# Patient Record
Sex: FEMALE | Race: WHITE
[De-identification: names, ages, dates, MRNs, and addresses within clinical notes are randomized per-mention and may not be internally consistent; named-entity substitution may affect disease eponyms.]

---

## 2017-12-17 ENCOUNTER — HOSPITAL ENCOUNTER (OUTPATIENT)
Dept: HOSPITAL 25 - ED | Age: 67
Setting detail: OBSERVATION
LOS: 2 days | Discharge: HOME | End: 2017-12-19
Attending: INTERNAL MEDICINE | Admitting: HOSPITALIST
Payer: MEDICARE

## 2017-12-17 DIAGNOSIS — F41.9: ICD-10-CM

## 2017-12-17 DIAGNOSIS — F32.9: ICD-10-CM

## 2017-12-17 DIAGNOSIS — J84.10: Primary | ICD-10-CM

## 2017-12-17 DIAGNOSIS — K21.9: ICD-10-CM

## 2017-12-17 DIAGNOSIS — M81.0: ICD-10-CM

## 2017-12-17 PROCEDURE — 94640 AIRWAY INHALATION TREATMENT: CPT

## 2017-12-17 PROCEDURE — 94760 N-INVAS EAR/PLS OXIMETRY 1: CPT

## 2017-12-17 PROCEDURE — 83880 ASSAY OF NATRIURETIC PEPTIDE: CPT

## 2017-12-17 PROCEDURE — 85379 FIBRIN DEGRADATION QUANT: CPT

## 2017-12-17 PROCEDURE — 80053 COMPREHEN METABOLIC PANEL: CPT

## 2017-12-17 PROCEDURE — 85610 PROTHROMBIN TIME: CPT

## 2017-12-17 PROCEDURE — 83605 ASSAY OF LACTIC ACID: CPT

## 2017-12-17 PROCEDURE — 85730 THROMBOPLASTIN TIME PARTIAL: CPT

## 2017-12-17 PROCEDURE — 96361 HYDRATE IV INFUSION ADD-ON: CPT

## 2017-12-17 PROCEDURE — 93005 ELECTROCARDIOGRAM TRACING: CPT

## 2017-12-17 PROCEDURE — 99284 EMERGENCY DEPT VISIT MOD MDM: CPT

## 2017-12-17 PROCEDURE — G0378 HOSPITAL OBSERVATION PER HR: HCPCS

## 2017-12-17 PROCEDURE — 96365 THER/PROPH/DIAG IV INF INIT: CPT

## 2017-12-17 PROCEDURE — 87502 INFLUENZA DNA AMP PROBE: CPT

## 2017-12-17 PROCEDURE — 84484 ASSAY OF TROPONIN QUANT: CPT

## 2017-12-17 PROCEDURE — 36415 COLL VENOUS BLD VENIPUNCTURE: CPT

## 2017-12-17 PROCEDURE — 71010: CPT

## 2017-12-17 PROCEDURE — 81003 URINALYSIS AUTO W/O SCOPE: CPT

## 2017-12-17 PROCEDURE — 85025 COMPLETE CBC W/AUTO DIFF WBC: CPT

## 2017-12-18 LAB
ALBUMIN SERPL BCG-MCNC: 4.2 G/DL (ref 3.2–5.2)
ALP SERPL-CCNC: 82 U/L (ref 34–104)
ALT SERPL W P-5'-P-CCNC: 13 U/L (ref 7–52)
ANION GAP SERPL CALC-SCNC: 7 MMOL/L (ref 2–11)
AST SERPL-CCNC: 26 U/L (ref 13–39)
BUN SERPL-MCNC: 9 MG/DL (ref 6–24)
BUN/CREAT SERPL: 12.3 (ref 8–20)
CALCIUM SERPL-MCNC: 9.3 MG/DL (ref 8.6–10.3)
CHLORIDE SERPL-SCNC: 103 MMOL/L (ref 101–111)
GLOBULIN SER CALC-MCNC: 2.8 G/DL (ref 2–4)
GLUCOSE SERPL-MCNC: 82 MG/DL (ref 70–100)
HCO3 SERPL-SCNC: 28 MMOL/L (ref 22–32)
HCT VFR BLD AUTO: 37 % (ref 35–47)
HCT VFR BLD AUTO: 41 % (ref 35–47)
HGB BLD-MCNC: 12.4 G/DL (ref 12–16)
HGB BLD-MCNC: 13.8 G/DL (ref 12–16)
MCH RBC QN AUTO: 28 PG (ref 27–31)
MCH RBC QN AUTO: 28 PG (ref 27–31)
MCHC RBC AUTO-ENTMCNC: 33 G/DL (ref 31–36)
MCHC RBC AUTO-ENTMCNC: 34 G/DL (ref 31–36)
MCV RBC AUTO: 85 FL (ref 80–97)
MCV RBC AUTO: 85 FL (ref 80–97)
POTASSIUM SERPL-SCNC: 3.6 MMOL/L (ref 3.5–5)
PROT SERPL-MCNC: 7 G/DL (ref 6.4–8.9)
RBC # BLD AUTO: 4.38 10^6/UL (ref 4–5.4)
RBC # BLD AUTO: 4.85 10^6/UL (ref 4–5.4)
SODIUM SERPL-SCNC: 138 MMOL/L (ref 133–145)
TROPONIN I SERPL-MCNC: 0.02 NG/ML (ref ?–0.04)
WBC # BLD AUTO: 4.5 10^3/UL (ref 3.5–10.8)
WBC # BLD AUTO: 6.8 10^3/UL (ref 3.5–10.8)

## 2017-12-18 RX ADMIN — CODEINE SULFATE PRN MG: 15 TABLET ORAL at 21:18

## 2017-12-18 RX ADMIN — BENZONATATE SCH MG: 100 CAPSULE ORAL at 09:07

## 2017-12-18 RX ADMIN — PHENOL PRN SPRAY: 1.4 SPRAY ORAL at 04:59

## 2017-12-18 RX ADMIN — ACETAMINOPHEN PRN MG: 325 TABLET ORAL at 03:41

## 2017-12-18 RX ADMIN — LIDOCAINE HYDROCHLORIDE PRN ML: 20 SOLUTION ORAL; TOPICAL at 22:18

## 2017-12-18 RX ADMIN — FLUTICASONE PROPIONATE SCH SPRAY: 50 SPRAY, METERED NASAL at 16:49

## 2017-12-18 RX ADMIN — PHENOL PRN SPRAY: 1.4 SPRAY ORAL at 07:38

## 2017-12-18 RX ADMIN — PHENOL PRN SPRAY: 1.4 SPRAY ORAL at 09:44

## 2017-12-18 RX ADMIN — ALBUTEROL SULFATE SCH MG: 2.5 SOLUTION RESPIRATORY (INHALATION) at 07:41

## 2017-12-18 RX ADMIN — ALBUTEROL SULFATE SCH MG: 2.5 SOLUTION RESPIRATORY (INHALATION) at 19:59

## 2017-12-18 RX ADMIN — ALBUTEROL SULFATE SCH MG: 2.5 SOLUTION RESPIRATORY (INHALATION) at 12:49

## 2017-12-18 RX ADMIN — MOMETASONE FUROATE AND FORMOTEROL FUMARATE DIHYDRATE SCH PUFF: 200; 5 AEROSOL RESPIRATORY (INHALATION) at 07:44

## 2017-12-18 RX ADMIN — METHYLPREDNISOLONE SODIUM SUCCINATE SCH MG: 40 INJECTION, POWDER, FOR SOLUTION INTRAMUSCULAR; INTRAVENOUS at 23:34

## 2017-12-18 RX ADMIN — MOMETASONE FUROATE AND FORMOTEROL FUMARATE DIHYDRATE SCH PUFF: 200; 5 AEROSOL RESPIRATORY (INHALATION) at 20:01

## 2017-12-18 RX ADMIN — OMEPRAZOLE SCH MG: 20 CAPSULE, DELAYED RELEASE ORAL at 04:59

## 2017-12-18 RX ADMIN — LIDOCAINE HYDROCHLORIDE PRN ML: 20 SOLUTION ORAL; TOPICAL at 16:52

## 2017-12-18 RX ADMIN — BENZONATATE SCH MG: 100 CAPSULE ORAL at 15:34

## 2017-12-18 RX ADMIN — GUAIFENESIN SCH MG: 600 TABLET, EXTENDED RELEASE ORAL at 21:16

## 2017-12-18 RX ADMIN — GUAIFENESIN SCH MG: 600 TABLET, EXTENDED RELEASE ORAL at 09:07

## 2017-12-18 RX ADMIN — SODIUM CHLORIDE SCH MLS/HR: 900 IRRIGANT IRRIGATION at 15:37

## 2017-12-18 RX ADMIN — ACETAMINOPHEN PRN MG: 325 TABLET ORAL at 15:35

## 2017-12-18 RX ADMIN — METHYLPREDNISOLONE SODIUM SUCCINATE SCH MG: 40 INJECTION, POWDER, FOR SOLUTION INTRAMUSCULAR; INTRAVENOUS at 15:36

## 2017-12-18 RX ADMIN — ACETAMINOPHEN PRN MG: 325 TABLET ORAL at 09:43

## 2017-12-18 RX ADMIN — SODIUM CHLORIDE SCH MLS/HR: 900 IRRIGANT IRRIGATION at 03:07

## 2017-12-18 RX ADMIN — BENZONATATE SCH MG: 100 CAPSULE ORAL at 21:18

## 2017-12-18 RX ADMIN — TIOTROPIUM BROMIDE SCH CAP: 18 CAPSULE ORAL; RESPIRATORY (INHALATION) at 07:45

## 2017-12-18 RX ADMIN — ACETAMINOPHEN PRN MG: 325 TABLET ORAL at 22:15

## 2017-12-18 NOTE — ED
Leonides RAMIREZ Tiffany, scribed for Freddie Spencer on 12/17/17 at 2334 .





Complex/Multi-Sys Presentation





- HPI Summary


HPI Summary: 


This patient is a 67 year old F presenting to [facility] accompanied by  with 

a chief complaint of cough with no production since ten days ago. The patient 

rates the pain 8/10 in severity. Symptoms aggravated by nothing. Symptoms 

alleviated by nothing. Patient reports throat pain, shortness of breath and 

chest pain. Patient denies fever and leg swelling.





The patient is in end stage pulmonary fibrosis. The patient uses an oxygen tank 

at home.





- History Of Current Complaint


Chief Complaint: EDUpperRespComplaint


Time Seen by Provider: 12/17/17 23:21


Hx Obtained From: Patient


Onset/Duration: Lasting Days - 10, Still Present


Aggravating Factor(s): Nothing


Alleviating Factor(s): Nothing


Associated Signs And Symptoms: Positive: Other - throat pain, shortness of 

breath and chest pain; NEGATIVE: fever and leg swelling





- Allergies/Home Medications


Allergies/Adverse Reactions: 


 Allergies











Allergy/AdvReac Type Severity Reaction Status Date / Time


 


No Known Allergies Allergy   Verified 11/12/14 13:37














PMH/Surg Hx/FS Hx/Imm Hx


Previously Healthy: No


Endocrine/Hematology History: 


   Denies: Hx Diabetes


Cardiovascular History: 


   Denies: Hx Congestive Heart Failure, Hx Hypertension


Respiratory History: Reports: Other Respiratory Problems/Disorders - HX 

PULMONARY FIBROSIS


GI History: Reports: Hx Gastroesophageal Reflux Disease


 History: 


   Denies: Hx Renal Disease


Musculoskeletal History: Reports: Other Musculoskeletal History


   Denies: Hx Rheumatoid Arthritis, Hx Osteoporosis


Sensory History: Reports: Hx Cataracts


   Denies: Hx Contacts or Glasses, Hx Hearing Aid


Opthamlomology History: Reports: Hx Cataracts


   Denies: Hx Contacts or Glasses


Psychiatric History: Reports: Hx Depression





- Surgical History


Surgery Procedure, Year, and Place: TUBAL, HYSTERECTOMY [facility].  2004 RIGHT 

CATARACT  [facility].  BRONCHOSCOPY WITH BX STRONG 2010.  RIGHT CTR [facility].  BUNIONECTOMY


Hx Anesthesia Reactions: No





- Immunization History


Date of Tetanus Vaccine: UNSURE


Date of Influenza Vaccine: 2013


Infectious Disease History: No


Infectious Disease History: 


   Denies: Traveled Outside the [place] in Last 30 Days





- Family History


Known Family History: Positive: None - None given





- Social History


Alcohol Use: Weekly


Alcohol Amount: 3-4/WEEK


Hx Substance Use: No


Substance Use Type: Reports: None


Hx Tobacco Use: Yes


Smoking Status (MU): Former Smoker





Review of Systems


Negative: Fever


Positive: Sore Throat


Positive: Chest Pain


Positive: Shortness Of Breath, Cough - With no production


Negative: Edema


All Other Systems Reviewed And Are Negative: Yes





Physical Exam





- Summary


Physical Exam Summary: 


Appearance: Well appearing, no pain distress


Skin: warm, dry, reflects adequate perfusion


Head/face: normal


Eyes: EOMI, JACQUI


ENT: normal


Neck: supple, non-tender


Respiratory: Bilateral coarse repetitions, occasional wheeze bilateral


Cardiovascular: RRR, pulses symmetrical 


Abdomen: non-tender, soft


Bowel: present


Musculoskeletal: normal, strength/ROM intact


Neuro: normal, sensory motor intact, A&Ox3


Triage Information Reviewed: Yes


Vital Signs On Initial Exam: 


 Initial Vitals











Temp Pulse Resp BP Pulse Ox


 


 97 F   80   18   146/85   94 


 


 12/17/17 21:43  12/17/17 21:43  12/17/17 21:43  12/17/17 21:43  12/17/17 21:43











Vital Signs Reviewed: Yes





Diagnostics





- Vital Signs


 Vital Signs











  Temp Pulse Resp BP Pulse Ox


 


 12/17/17 21:43  97 F  80  18  146/85  94














- Laboratory


Lab Results: 


 Lab Results











  12/18/17 12/18/17 12/18/17 Range/Units





  00:01 00:01 00:01 


 


WBC   6.8   (3.5-10.8)  10^3/ul


 


RBC   4.85   (4.0-5.4)  10^6/ul


 


Hgb   13.8   (12.0-16.0)  g/dl


 


Hct   41   (35-47)  %


 


MCV   85   (80-97)  fL


 


MCH   28   (27-31)  pg


 


MCHC   33   (31-36)  g/dl


 


RDW   14   (10.5-15)  %


 


Plt Count   139 L   (150-450)  10^3/ul


 


MPV   9   (7.4-10.4)  um3


 


Neut % (Auto)   58.2   (38-83)  %


 


Lymph % (Auto)   29.9   (25-47)  %


 


Mono % (Auto)   9.8 H   (1-9)  %


 


Eos % (Auto)   1.6   (0-6)  %


 


Baso % (Auto)   0.5   (0-2)  %


 


Absolute Neuts (auto)   4.0   (1.5-7.7)  10^3/ul


 


Absolute Lymphs (auto)   2.0   (1.0-4.8)  10^3/ul


 


Absolute Monos (auto)   0.7   (0-0.8)  10^3/ul


 


Absolute Eos (auto)   0.1   (0-0.6)  10^3/ul


 


Absolute Basos (auto)   0   (0-0.2)  10^3/ul


 


Absolute Nucleated RBC   0   10^3/ul


 


Nucleated RBC %   0.1   


 


INR (Anticoag Therapy)     (0.77-1.02)  


 


APTT     (26.0-36.3)  seconds


 


D-Dimer, Quantitative     (Less Than 230)  ng/mL


 


Sodium    138  (133-145)  mmol/L


 


Potassium    3.6  (3.5-5.0)  mmol/L


 


Chloride    103  (101-111)  mmol/L


 


Carbon Dioxide    28  (22-32)  mmol/L


 


Anion Gap    7  (2-11)  mmol/L


 


BUN    9  (6-24)  mg/dL


 


Creatinine    0.73  (0.51-0.95)  mg/dL


 


Est GFR ( Amer)    102.0  (>60)  


 


Est GFR (Non-Af Amer)    79.3  (>60)  


 


BUN/Creatinine Ratio    12.3  (8-20)  


 


Glucose    82  ()  mg/dL


 


Lactic Acid     (0.5-2.0)  mmol/L


 


Calcium    9.3  (8.6-10.3)  mg/dL


 


Total Bilirubin    0.40  (0.2-1.0)  mg/dL


 


AST    26  (13-39)  U/L


 


ALT    13  (7-52)  U/L


 


Alkaline Phosphatase    82  ()  U/L


 


Troponin I    0.02  (<0.04)  ng/mL


 


B-Natriuretic Peptide  84   ( - 100) pg/mL


 


Total Protein    7.0  (6.4-8.9)  g/dL


 


Albumin    4.2  (3.2-5.2)  g/dL


 


Globulin    2.8  (2-4)  g/dL


 


Albumin/Globulin Ratio    1.5  (1-3)  


 


Urine Color     


 


Urine Appearance     


 


Urine pH     (5-9)  


 


Ur Specific Gravity     (1.010-1.030)  


 


Urine Protein     (Negative)  


 


Urine Ketones     (Negative)  


 


Urine Blood     (Negative)  


 


Urine Nitrate     (Negative)  


 


Urine Bilirubin     (Negative)  


 


Urine Urobilinogen     (Negative)  


 


Ur Leukocyte Esterase     (Negative)  


 


Urine Glucose     (Negative)  














  12/18/17 12/18/17 12/18/17 Range/Units





  00:01 00:01 00:41 


 


WBC     (3.5-10.8)  10^3/ul


 


RBC     (4.0-5.4)  10^6/ul


 


Hgb     (12.0-16.0)  g/dl


 


Hct     (35-47)  %


 


MCV     (80-97)  fL


 


MCH     (27-31)  pg


 


MCHC     (31-36)  g/dl


 


RDW     (10.5-15)  %


 


Plt Count     (150-450)  10^3/ul


 


MPV     (7.4-10.4)  um3


 


Neut % (Auto)     (38-83)  %


 


Lymph % (Auto)     (25-47)  %


 


Mono % (Auto)     (1-9)  %


 


Eos % (Auto)     (0-6)  %


 


Baso % (Auto)     (0-2)  %


 


Absolute Neuts (auto)     (1.5-7.7)  10^3/ul


 


Absolute Lymphs (auto)     (1.0-4.8)  10^3/ul


 


Absolute Monos (auto)     (0-0.8)  10^3/ul


 


Absolute Eos (auto)     (0-0.6)  10^3/ul


 


Absolute Basos (auto)     (0-0.2)  10^3/ul


 


Absolute Nucleated RBC     10^3/ul


 


Nucleated RBC %     


 


INR (Anticoag Therapy)   0.96   (0.77-1.02)  


 


APTT   36.6 H   (26.0-36.3)  seconds


 


D-Dimer, Quantitative   < 200   (Less Than 230)  ng/mL


 


Sodium     (133-145)  mmol/L


 


Potassium     (3.5-5.0)  mmol/L


 


Chloride     (101-111)  mmol/L


 


Carbon Dioxide     (22-32)  mmol/L


 


Anion Gap     (2-11)  mmol/L


 


BUN     (6-24)  mg/dL


 


Creatinine     (0.51-0.95)  mg/dL


 


Est GFR ( Amer)     (>60)  


 


Est GFR (Non-Af Amer)     (>60)  


 


BUN/Creatinine Ratio     (8-20)  


 


Glucose     ()  mg/dL


 


Lactic Acid  1.1    (0.5-2.0)  mmol/L


 


Calcium     (8.6-10.3)  mg/dL


 


Total Bilirubin     (0.2-1.0)  mg/dL


 


AST     (13-39)  U/L


 


ALT     (7-52)  U/L


 


Alkaline Phosphatase     ()  U/L


 


Troponin I     (<0.04)  ng/mL


 


B-Natriuretic Peptide    ( - 100) pg/mL


 


Total Protein     (6.4-8.9)  g/dL


 


Albumin     (3.2-5.2)  g/dL


 


Globulin     (2-4)  g/dL


 


Albumin/Globulin Ratio     (1-3)  


 


Urine Color    Colorless  


 


Urine Appearance    Clear  


 


Urine pH    6.0  (5-9)  


 


Ur Specific Gravity    1.003 L  (1.010-1.030)  


 


Urine Protein    Negative  (Negative)  


 


Urine Ketones    Negative  (Negative)  


 


Urine Blood    Negative  (Negative)  


 


Urine Nitrate    Negative  (Negative)  


 


Urine Bilirubin    Negative  (Negative)  


 


Urine Urobilinogen    Negative  (Negative)  


 


Ur Leukocyte Esterase    Negative  (Negative)  


 


Urine Glucose    Negative  (Negative)  











Result Diagrams: 


 12/18/17 00:01





 12/18/17 00:01


Lab Statement: Any lab studies that have been ordered have been reviewed, and 

results considered in the medical decision making process.





- Radiology


  ** CXR


Radiology Interpretation Completed By: ED Physician - Bilateral intra fibrosis





- EKG


  ** 23:52


Cardiac Rate: NL


EKG Rhythm: Sinus Rhythm - 86 BPM


EKG Interpretation: No acute changes





Complex Multi-Symp Course/Dx


Course Of Treatment: This patient is a 67 year old F presenting to [facility] 

accompanied by  with a chief complaint of cough with no production since 

ten days ago.  .  An EKG reveals sinus rhythm 86 BPM and no acute changes. CXR 

reveals, per ED physician, bilateral intra fibrosis. Bloodwork/UA obtained. In 

the ED course the patient was given Duoneb and Solu-Medrol. I consulted with 

hospitalist who agreed to admit the patient. The patient is agreeable with this 

plan.





- Diagnoses


Differential Diagnoses/HQI/PQRI: Aspiration, Sepsis, Other - pulmonary fibrosis

, dysphnea, resp failure, hypoxia


Provider Diagnoses: 


 Pulmonary fibrosis, Dyspnea, Hypoxia








- Physician Notifications


Discussed Care Of Patient With: Fred Frankenberg


Time Discussed With Above Provider: 01:02


Instructed by Provider To: Other - Dr. Frankenberg (hospitalist) agreed to 

admit the patient.





Discharge





- Discharge Plan


Condition: Fair


Disposition: ADMITTED TO [facility]





The documentation as recorded by the Leonides cooper Tiffany accurately reflects 

the service I personally performed and the decisions made by me, Freddie Spencer.

## 2017-12-18 NOTE — PN
Subjective


Date of Service: 12/18/17


Interval History: 





Cough continues today 


Throat pain 


+b/l headache, bitemporal, throbbing, no photo, phonophobia, no N/V





Objective


Active Medications: 








Acetaminophen (Tylenol Tab*)  975 mg PO Q6H PRN


   PRN Reason: FEVER/PAIN


   Last Admin: 12/18/17 15:35 Dose:  975 mg


Albuterol (Ventolin 2.5 Mg/3 Ml Neb.Sol*)  2.5 mg INH Q2H PRN


   PRN Reason: SOB/WHEEZING


Albuterol (Ventolin 2.5 Mg/3 Ml Neb.Sol*)  2.5 mg INH [id number] AWAKE [facility]


   Last Admin: 12/18/17 12:49 Dose:  2.5 mg


Benzonatate (Tessalon Cap*)  100 mg PO TID [facility]


   Last Admin: 12/18/17 15:34 Dose:  100 mg


Codeine Sulfate (Codeine Tab*)  15 mg PO Q6H PRN


   PRN Reason: cough


Fluticasone Propionate (Flonase Nasal Spray 50mcg*)  2 spray BOTH NARES DAILY 

[facility]


Guaifenesin (Mucinex*)  1,200 mg PO BID [facility]


   Last Admin: 12/18/17 09:07 Dose:  1,200 mg


Heparin Sodium (Porcine) (Heparin Vial(*))  5,000 units SUBCUT Q8HR [facility]


Sodium Chloride (Ns 0.9% 1000 Ml*)  1,000 mls @ 75 mls/hr IV PER RATE [facility]


   Last Admin: 12/18/17 15:37 Dose:  75 mls/hr


Lidocaine (Xylocaine 2% Viscous*)  20 ml PO TID PRN


   PRN Reason: PAIN


Melatonin (Melatonin (Nf))  3 mg PO BEDTIME PRN; Protocol


   PRN Reason: Sleep


Methylprednisolone Sodium Succinate (Solu-Medrol 40 Mg)  40 mg IV Q8H [facility]


   Last Admin: 12/18/17 15:36 Dose:  40 mg


Mometasone Furoate/Formoterol Fumar (Dulera 200/5 Mdi*)  2 puff INH BID [facility]


   Last Admin: 12/18/17 07:44 Dose:  2 puff


Morphine Sulfate (Morphine Inj (Syringe)*)  2 mg IV Q4H PRN


   PRN Reason: SOB/air hunger


Omeprazole (Prilosec Cap*)  20 mg PO DAILY@0600 [facility]


   Last Admin: 12/18/17 04:59 Dose:  20 mg


Ondansetron HCl (Zofran Inj*)  4 mg IV Q6H PRN


   PRN Reason: NAUSEA


Phenol/Menthol (Chloroseptic Throat Spray*)  1 spray MT Q2H PRN


   PRN Reason: SORE THROAT


   Last Admin: 12/18/17 09:44 Dose:  1 spray


Throat Lozenges (Chloraseptic Pita*)  1 pita PO Q2H PRN


   PRN Reason: SORE THROAT


   Last Admin: 12/18/17 04:59 Dose:  1 pita


Tiotropium Bromide (Spiriva Cap.Inh*)  1 cap INH DAILY [facility]


   Last Admin: 12/18/17 07:45 Dose:  1 cap








 Vital Signs - 8 hr











  12/18/17 12/18/17 12/18/17





  11:45 12:44 12:49


 


Temperature 98.2 F  


 


Pulse Rate 84  85


 


Respiratory 18 22 17





Rate   


 


Blood Pressure 133/69  





(mmHg)   


 


O2 Sat by Pulse 96  94





Oximetry   














  12/18/17 12/18/17





  15:27 15:38


 


Temperature 98.1 F 


 


Pulse Rate 83 


 


Respiratory 18 16





Rate  


 


Blood Pressure 160/77 





(mmHg)  


 


O2 Sat by Pulse 94 





Oximetry  











Oxygen Devices in Use Now: Nasal Cannula


Appearance: older than stated age, NAD


Eyes: No Scleral Icterus, PERRLA


Ears/Nose/Mouth/Throat: Clear Oropharnyx, Mucous Membranes Moist


Neck: NL Appearance and Movements; NL JVP, Trachea Midline


Respiratory: Symmetrical Chest Expansion and Respiratory Effort, - - fine rales 

b/l bases up 1/2


Cardiovascular: NL Sounds; No Murmurs; No JVD, RRR


Abdominal: NL Sounds; No Tenderness; No Distention, No Hepatosplenomegaly


Lymphatic: No Cervical Adenopathy


Skin: No Rash or Ulcers


Neurological: Alert and Oriented x 3


Result Diagrams: 


 12/18/17 06:22





 12/18/17 00:01


Additional Lab and Data: 


 Lab Results











  12/18/17 12/18/17 12/18/17 Range/Units





  00:01 00:01 00:01 


 


WBC   6.8   (3.5-10.8)  10^3/ul


 


RBC   4.85   (4.0-5.4)  10^6/ul


 


Hgb   13.8   (12.0-16.0)  g/dl


 


Hct   41   (35-47)  %


 


MCV   85   (80-97)  fL


 


MCH   28   (27-31)  pg


 


MCHC   33   (31-36)  g/dl


 


RDW   14   (10.5-15)  %


 


Plt Count   139 L   (150-450)  10^3/ul


 


MPV   9   (7.4-10.4)  um3


 


Neut % (Auto)   58.2   (38-83)  %


 


Lymph % (Auto)   29.9   (25-47)  %


 


Mono % (Auto)   9.8 H   (1-9)  %


 


Eos % (Auto)   1.6   (0-6)  %


 


Baso % (Auto)   0.5   (0-2)  %


 


Absolute Neuts (auto)   4.0   (1.5-7.7)  10^3/ul


 


Absolute Lymphs (auto)   2.0   (1.0-4.8)  10^3/ul


 


Absolute Monos (auto)   0.7   (0-0.8)  10^3/ul


 


Absolute Eos (auto)   0.1   (0-0.6)  10^3/ul


 


Absolute Basos (auto)   0   (0-0.2)  10^3/ul


 


Absolute Nucleated RBC   0   10^3/ul


 


Nucleated RBC %   0.1   


 


INR (Anticoag Therapy)     (0.77-1.02)  


 


APTT     (26.0-36.3)  seconds


 


D-Dimer, Quantitative     (Less Than 230)  ng/mL


 


Sodium    138  (133-145)  mmol/L


 


Potassium    3.6  (3.5-5.0)  mmol/L


 


Chloride    103  (101-111)  mmol/L


 


Carbon Dioxide    28  (22-32)  mmol/L


 


Anion Gap    7  (2-11)  mmol/L


 


BUN    9  (6-24)  mg/dL


 


Creatinine    0.73  (0.51-0.95)  mg/dL


 


Est GFR ( Amer)    102.0  (>60)  


 


Est GFR (Non-Af Amer)    79.3  (>60)  


 


BUN/Creatinine Ratio    12.3  (8-20)  


 


Glucose    82  ()  mg/dL


 


Lactic Acid     (0.5-2.0)  mmol/L


 


Calcium    9.3  (8.6-10.3)  mg/dL


 


Total Bilirubin    0.40  (0.2-1.0)  mg/dL


 


AST    26  (13-39)  U/L


 


ALT    13  (7-52)  U/L


 


Alkaline Phosphatase    82  ()  U/L


 


Troponin I    0.02  (<0.04)  ng/mL


 


B-Natriuretic Peptide  84   ( - 100) pg/mL


 


Total Protein    7.0  (6.4-8.9)  g/dL


 


Albumin    4.2  (3.2-5.2)  g/dL


 


Globulin    2.8  (2-4)  g/dL


 


Albumin/Globulin Ratio    1.5  (1-3)  


 


Urine Color     


 


Urine Appearance     


 


Urine pH     (5-9)  


 


Ur Specific Gravity     (1.010-1.030)  


 


Urine Protein     (Negative)  


 


Urine Ketones     (Negative)  


 


Urine Blood     (Negative)  


 


Urine Nitrate     (Negative)  


 


Urine Bilirubin     (Negative)  


 


Urine Urobilinogen     (Negative)  


 


Ur Leukocyte Esterase     (Negative)  


 


Urine Glucose     (Negative)  














  12/18/17 12/18/17 12/18/17 Range/Units





  00:01 00:01 00:41 


 


WBC     (3.5-10.8)  10^3/ul


 


RBC     (4.0-5.4)  10^6/ul


 


Hgb     (12.0-16.0)  g/dl


 


Hct     (35-47)  %


 


MCV     (80-97)  fL


 


MCH     (27-31)  pg


 


MCHC     (31-36)  g/dl


 


RDW     (10.5-15)  %


 


Plt Count     (150-450)  10^3/ul


 


MPV     (7.4-10.4)  um3


 


Neut % (Auto)     (38-83)  %


 


Lymph % (Auto)     (25-47)  %


 


Mono % (Auto)     (1-9)  %


 


Eos % (Auto)     (0-6)  %


 


Baso % (Auto)     (0-2)  %


 


Absolute Neuts (auto)     (1.5-7.7)  10^3/ul


 


Absolute Lymphs (auto)     (1.0-4.8)  10^3/ul


 


Absolute Monos (auto)     (0-0.8)  10^3/ul


 


Absolute Eos (auto)     (0-0.6)  10^3/ul


 


Absolute Basos (auto)     (0-0.2)  10^3/ul


 


Absolute Nucleated RBC     10^3/ul


 


Nucleated RBC %     


 


INR (Anticoag Therapy)   0.96   (0.77-1.02)  


 


APTT   36.6 H   (26.0-36.3)  seconds


 


D-Dimer, Quantitative   < 200   (Less Than 230)  ng/mL


 


Sodium     (133-145)  mmol/L


 


Potassium     (3.5-5.0)  mmol/L


 


Chloride     (101-111)  mmol/L


 


Carbon Dioxide     (22-32)  mmol/L


 


Anion Gap     (2-11)  mmol/L


 


BUN     (6-24)  mg/dL


 


Creatinine     (0.51-0.95)  mg/dL


 


Est GFR ( Amer)     (>60)  


 


Est GFR (Non-Af Amer)     (>60)  


 


BUN/Creatinine Ratio     (8-20)  


 


Glucose     ()  mg/dL


 


Lactic Acid  1.1    (0.5-2.0)  mmol/L


 


Calcium     (8.6-10.3)  mg/dL


 


Total Bilirubin     (0.2-1.0)  mg/dL


 


AST     (13-39)  U/L


 


ALT     (7-52)  U/L


 


Alkaline Phosphatase     ()  U/L


 


Troponin I     (<0.04)  ng/mL


 


B-Natriuretic Peptide    ( - 100) pg/mL


 


Total Protein     (6.4-8.9)  g/dL


 


Albumin     (3.2-5.2)  g/dL


 


Globulin     (2-4)  g/dL


 


Albumin/Globulin Ratio     (1-3)  


 


Urine Color    Colorless  


 


Urine Appearance    Clear  


 


Urine pH    6.0  (5-9)  


 


Ur Specific Gravity    1.003 L  (1.010-1.030)  


 


Urine Protein    Negative  (Negative)  


 


Urine Ketones    Negative  (Negative)  


 


Urine Blood    Negative  (Negative)  


 


Urine Nitrate    Negative  (Negative)  


 


Urine Bilirubin    Negative  (Negative)  


 


Urine Urobilinogen    Negative  (Negative)  


 


Ur Leukocyte Esterase    Negative  (Negative)  


 


Urine Glucose    Negative  (Negative)  











Microbiology and Other Data: 


 Microbiology











 12/18/17 02:30 Influenza Types A,B Antigen (MICHAEL) - Final





 Nasal    Specimen received for Influenza A/B Molecular testing














Assess/Plan/Problems-Billing


Assessment: 





68 yo F h/o pulmonary fibrosis (hypersensitivity pneumonitis) p/w cough x 12 

days punctuated by increased SOB





- Patient Problems


(1) Hypersensitivity pneumonitis


Comment: with acute exacerbation likely in setting of viral illness


PFTs showed e/o airway reversibility, albuterol as needed


c/w steroids today but plan on taper tomorrow    





(2) Chronic respiratory failure


Comment: c/w 3L o2   





(3) Cough


Comment: codeine, lidocaine, tessalon   





(4) DVT prophylaxis


Comment: lovenox

## 2017-12-18 NOTE — HP
H&P (Free Text)


History and Physical: 





PCP: DANIELLE Soto MD


Pulmonology: ADAM Mccoy MD





Date/Time: 12/18/2017 0140





CC: SOB





HPI: Mrs Suarez is a 67F HX end-stage pulmonary fibrosis on 3L NC oxygen 

continuously presents with 2 weeks of intractable cough, increasing exertional 

dyspnea, and generalized weakness. Cough is non-productive. She does admit to a 

"flu" that went through her family 2-3 weeks ago. She has emesis only with 

coughing fits, head congestion, & fatigue, but denies F/C, sweats, nausea, 

change in bowel/bladder, chest pain, palpitations, or other issues. She reports 

that steroids usually work well.





PMedHx


end-stage pulmonary fibrosis


GERD


depression


osteoporosis





Ambulatory Orders


Albuterol Sulfate [Ventolin Hfa] 44 mcg INH Q4HR PRN 01/19/14 


Budesonide/Formote 160/4.5(NF) [Symbicort 160/4.5 (NF)] 2 puff INH BID 01/19/14 


Omeprazole 20 mg PO QAM 01/19/14 


Prednisone 60 mg PO DAILY 01/19/14 


Sertraline HCl 150 mg PO QPM 01/19/14 


Lactobacillus [Probiotic] 1 cap PO QAM 11/06/14 


Risedronate Sodium [Actonel] 1 tab PO WEEKLY 11/06/14 


Sulfamethox/Trimethoprim DS* [Bactrim /160 TAB*] 1 tab PO SEE 

INSTRUCTIONS 11/06/14 





Allergies


No Known Allergies Allergy (Verified 11/12/14 13:37)





PSurgHx


OU cataract extractions


hysterectomy


bunionectomy





SocHx: no tobacco, 3-4 glasses wine/week, no recreational drugs; lives with her 

; DNR/I code status





FamHx: reviewed, non-contributory





ROS: as above, otherwise reviewed and all were negative





vitals: 


 Vital Signs











Temp  36.6 C   12/18/17 03:18


 


Pulse  97   12/18/17 03:18


 


Resp  22   12/18/17 03:18


 


BP  143/79   12/18/17 03:18


 


Pulse Ox  95   12/18/17 03:18








 Intake & Output











 12/17/17 12/17/17 12/18/17





 11:59 23:59 11:59


 


Intake Total   240


 


Output Total   650


 


Balance   -410


 


Weight  56.699 kg 56.699 kg


 


Intake:   


 


  Oral   240


 


Output:   


 


  Urine   650








Constitutional: NAD, normally developed, well-nourished white female


HEENM: atraumatic; sclera/conjunctiva: anicteric/clear; hearing: clinically 

intact; oropharynx: clear, mucosa moist


Neck: soft tissue: non-tender; thyroid: normal


Pulmonary: diffuse mild crackles, fair aeration, no accessory muscle use


CV: RR/RR, normal S1S2, no carotid bruit, no jugular venous distention, 2+ B DP/

PT, no edema


Abdominal: soft, non-distended, non-tender, no rebound/guarding/rigidity, 

normoactive bowel sounds, no hepatosplenomegaly or masses, no costovertebral 

angle tenderness


Musculoskeletal: general: grossly intact; gait: stable


Integumental: normal appearance and texture of exposed skin





Psychiatric 


orientation: AA&O to PPS


affect: calm


mood: pleasant


eye contact: good


content: reliable


responses: timely


insight: good





Testing: 


 Lab Results











  12/18/17 12/18/17 12/18/17 Range/Units





  00:01 00:01 00:01 


 


WBC   6.8   (3.5-10.8)  10^3/ul


 


RBC   4.85   (4.0-5.4)  10^6/ul


 


Hgb   13.8   (12.0-16.0)  g/dl


 


Hct   41   (35-47)  %


 


MCV   85   (80-97)  fL


 


MCH   28   (27-31)  pg


 


MCHC   33   (31-36)  g/dl


 


RDW   14   (10.5-15)  %


 


Plt Count   139 L   (150-450)  10^3/ul


 


MPV   9   (7.4-10.4)  um3


 


Neut % (Auto)   58.2   (38-83)  %


 


Lymph % (Auto)   29.9   (25-47)  %


 


Mono % (Auto)   9.8 H   (1-9)  %


 


Eos % (Auto)   1.6   (0-6)  %


 


Baso % (Auto)   0.5   (0-2)  %


 


Absolute Neuts (auto)   4.0   (1.5-7.7)  10^3/ul


 


Absolute Lymphs (auto)   2.0   (1.0-4.8)  10^3/ul


 


Absolute Monos (auto)   0.7   (0-0.8)  10^3/ul


 


Absolute Eos (auto)   0.1   (0-0.6)  10^3/ul


 


Absolute Basos (auto)   0   (0-0.2)  10^3/ul


 


Absolute Nucleated RBC   0   10^3/ul


 


Nucleated RBC %   0.1   


 


INR (Anticoag Therapy)     (0.77-1.02)  


 


APTT     (26.0-36.3)  seconds


 


D-Dimer, Quantitative     (Less Than 230)  ng/mL


 


Sodium    138  (133-145)  mmol/L


 


Potassium    3.6  (3.5-5.0)  mmol/L


 


Chloride    103  (101-111)  mmol/L


 


Carbon Dioxide    28  (22-32)  mmol/L


 


Anion Gap    7  (2-11)  mmol/L


 


BUN    9  (6-24)  mg/dL


 


Creatinine    0.73  (0.51-0.95)  mg/dL


 


Est GFR ( Amer)    102.0  (>60)  


 


Est GFR (Non-Af Amer)    79.3  (>60)  


 


BUN/Creatinine Ratio    12.3  (8-20)  


 


Glucose    82  ()  mg/dL


 


Lactic Acid     (0.5-2.0)  mmol/L


 


Calcium    9.3  (8.6-10.3)  mg/dL


 


Total Bilirubin    0.40  (0.2-1.0)  mg/dL


 


AST    26  (13-39)  U/L


 


ALT    13  (7-52)  U/L


 


Alkaline Phosphatase    82  ()  U/L


 


Troponin I    0.02  (<0.04)  ng/mL


 


B-Natriuretic Peptide  84   ( - 100) pg/mL


 


Total Protein    7.0  (6.4-8.9)  g/dL


 


Albumin    4.2  (3.2-5.2)  g/dL


 


Globulin    2.8  (2-4)  g/dL


 


Albumin/Globulin Ratio    1.5  (1-3)  


 


Urine Color     


 


Urine Appearance     


 


Urine pH     (5-9)  


 


Ur Specific Gravity     (1.010-1.030)  


 


Urine Protein     (Negative)  


 


Urine Ketones     (Negative)  


 


Urine Blood     (Negative)  


 


Urine Nitrate     (Negative)  


 


Urine Bilirubin     (Negative)  


 


Urine Urobilinogen     (Negative)  


 


Ur Leukocyte Esterase     (Negative)  


 


Urine Glucose     (Negative)  


 


Influenza A (Rapid)     (Negative)  


 


Influenza B (Rapid)     (Negative)  














  12/18/17 12/18/17 12/18/17 Range/Units





  00:01 00:01 00:41 


 


WBC     (3.5-10.8)  10^3/ul


 


RBC     (4.0-5.4)  10^6/ul


 


Hgb     (12.0-16.0)  g/dl


 


Hct     (35-47)  %


 


MCV     (80-97)  fL


 


MCH     (27-31)  pg


 


MCHC     (31-36)  g/dl


 


RDW     (10.5-15)  %


 


Plt Count     (150-450)  10^3/ul


 


MPV     (7.4-10.4)  um3


 


Neut % (Auto)     (38-83)  %


 


Lymph % (Auto)     (25-47)  %


 


Mono % (Auto)     (1-9)  %


 


Eos % (Auto)     (0-6)  %


 


Baso % (Auto)     (0-2)  %


 


Absolute Neuts (auto)     (1.5-7.7)  10^3/ul


 


Absolute Lymphs (auto)     (1.0-4.8)  10^3/ul


 


Absolute Monos (auto)     (0-0.8)  10^3/ul


 


Absolute Eos (auto)     (0-0.6)  10^3/ul


 


Absolute Basos (auto)     (0-0.2)  10^3/ul


 


Absolute Nucleated RBC     10^3/ul


 


Nucleated RBC %     


 


INR (Anticoag Therapy)   0.96   (0.77-1.02)  


 


APTT   36.6 H   (26.0-36.3)  seconds


 


D-Dimer, Quantitative   < 200   (Less Than 230)  ng/mL


 


Sodium     (133-145)  mmol/L


 


Potassium     (3.5-5.0)  mmol/L


 


Chloride     (101-111)  mmol/L


 


Carbon Dioxide     (22-32)  mmol/L


 


Anion Gap     (2-11)  mmol/L


 


BUN     (6-24)  mg/dL


 


Creatinine     (0.51-0.95)  mg/dL


 


Est GFR ( Amer)     (>60)  


 


Est GFR (Non-Af Amer)     (>60)  


 


BUN/Creatinine Ratio     (8-20)  


 


Glucose     ()  mg/dL


 


Lactic Acid  1.1    (0.5-2.0)  mmol/L


 


Calcium     (8.6-10.3)  mg/dL


 


Total Bilirubin     (0.2-1.0)  mg/dL


 


AST     (13-39)  U/L


 


ALT     (7-52)  U/L


 


Alkaline Phosphatase     ()  U/L


 


Troponin I     (<0.04)  ng/mL


 


B-Natriuretic Peptide    ( - 100) pg/mL


 


Total Protein     (6.4-8.9)  g/dL


 


Albumin     (3.2-5.2)  g/dL


 


Globulin     (2-4)  g/dL


 


Albumin/Globulin Ratio     (1-3)  


 


Urine Color    Colorless  


 


Urine Appearance    Clear  


 


Urine pH    6.0  (5-9)  


 


Ur Specific Gravity    1.003 L  (1.010-1.030)  


 


Urine Protein    Negative  (Negative)  


 


Urine Ketones    Negative  (Negative)  


 


Urine Blood    Negative  (Negative)  


 


Urine Nitrate    Negative  (Negative)  


 


Urine Bilirubin    Negative  (Negative)  


 


Urine Urobilinogen    Negative  (Negative)  


 


Ur Leukocyte Esterase    Negative  (Negative)  


 


Urine Glucose    Negative  (Negative)  


 


Influenza A (Rapid)     (Negative)  


 


Influenza B (Rapid)     (Negative)  














  12/18/17 Range/Units





  02:35 


 


WBC   (3.5-10.8)  10^3/ul


 


RBC   (4.0-5.4)  10^6/ul


 


Hgb   (12.0-16.0)  g/dl


 


Hct   (35-47)  %


 


MCV   (80-97)  fL


 


MCH   (27-31)  pg


 


MCHC   (31-36)  g/dl


 


RDW   (10.5-15)  %


 


Plt Count   (150-450)  10^3/ul


 


MPV   (7.4-10.4)  um3


 


Neut % (Auto)   (38-83)  %


 


Lymph % (Auto)   (25-47)  %


 


Mono % (Auto)   (1-9)  %


 


Eos % (Auto)   (0-6)  %


 


Baso % (Auto)   (0-2)  %


 


Absolute Neuts (auto)   (1.5-7.7)  10^3/ul


 


Absolute Lymphs (auto)   (1.0-4.8)  10^3/ul


 


Absolute Monos (auto)   (0-0.8)  10^3/ul


 


Absolute Eos (auto)   (0-0.6)  10^3/ul


 


Absolute Basos (auto)   (0-0.2)  10^3/ul


 


Absolute Nucleated RBC   10^3/ul


 


Nucleated RBC %   


 


INR (Anticoag Therapy)   (0.77-1.02)  


 


APTT   (26.0-36.3)  seconds


 


D-Dimer, Quantitative   (Less Than 230)  ng/mL


 


Sodium   (133-145)  mmol/L


 


Potassium   (3.5-5.0)  mmol/L


 


Chloride   (101-111)  mmol/L


 


Carbon Dioxide   (22-32)  mmol/L


 


Anion Gap   (2-11)  mmol/L


 


BUN   (6-24)  mg/dL


 


Creatinine   (0.51-0.95)  mg/dL


 


Est GFR ( Amer)   (>60)  


 


Est GFR (Non-Af Amer)   (>60)  


 


BUN/Creatinine Ratio   (8-20)  


 


Glucose   ()  mg/dL


 


Lactic Acid   (0.5-2.0)  mmol/L


 


Calcium   (8.6-10.3)  mg/dL


 


Total Bilirubin   (0.2-1.0)  mg/dL


 


AST   (13-39)  U/L


 


ALT   (7-52)  U/L


 


Alkaline Phosphatase   ()  U/L


 


Troponin I   (<0.04)  ng/mL


 


B-Natriuretic Peptide  ( - 100) pg/mL


 


Total Protein   (6.4-8.9)  g/dL


 


Albumin   (3.2-5.2)  g/dL


 


Globulin   (2-4)  g/dL


 


Albumin/Globulin Ratio   (1-3)  


 


Urine Color   


 


Urine Appearance   


 


Urine pH   (5-9)  


 


Ur Specific Gravity   (1.010-1.030)  


 


Urine Protein   (Negative)  


 


Urine Ketones   (Negative)  


 


Urine Blood   (Negative)  


 


Urine Nitrate   (Negative)  


 


Urine Bilirubin   (Negative)  


 


Urine Urobilinogen   (Negative)  


 


Ur Leukocyte Esterase   (Negative)  


 


Urine Glucose   (Negative)  


 


Influenza A (Rapid)  Negative  (Negative)  


 


Influenza B (Rapid)  Negative  (Negative)  








ECG, personally reviewed: NSR rate 86, no ischemia





CXR, personally reviewed: diffuse infiltrates B w/o recent comparison CXR





Impression: 67F HX end-stage pulmonary fibrosis presents with 2weeks increased 

non-productive cough, increased SOB, & new hypoxia on 3L NC





DIAGNOSIS & PLAN


Primary 


pulmonary fibrosis


: albuterol nebs


: mometasone/formoterol


: tiotropium


: IV methylprednisolone


: supplemental oxygen


: no clear indication for ABX


: trend temperature & CBC


: supportive care


: consider pulmonology consult in AM





Secondary 


GERD


: continue omeprazole





depression


: review meds once reconciled





Admission Rational: observe for SOB & hypoxia in setting of end-stage pulmonary 

fibrosis


DVTp: heparin 


Code Status: DNR/I


HCP:

## 2017-12-18 NOTE — RAD
INDICATION: Difficulty breathing. Requisition notes history of pulmonary fibrosis.



COMPARISON: Chest x-ray dated January 18, 2014

 

TECHNIQUE: Single AP portable view of the chest was obtained.



FINDINGS: 



Image quality is compromised due to the relative inferiority of a portable chest x-ray.



There is mild cardiomegaly unchanged since the prior chest x-ray.



There are patchy densities overlying the bilateral lungs and increased parenchymal density

worse when compared to the prior chest x-ray. There is no focal or lobar consolidation.



Visualized bones are normal for the patient's age.



IMPRESSION:  Worsening airspace disease could represent progression of pulmonary fibrosis

and/or cardiogenic pulmonary edema on a background of underlying pulmonary fibrosis.

## 2017-12-19 VITALS — SYSTOLIC BLOOD PRESSURE: 130 MMHG | DIASTOLIC BLOOD PRESSURE: 76 MMHG

## 2017-12-19 RX ADMIN — MOMETASONE FUROATE AND FORMOTEROL FUMARATE DIHYDRATE SCH PUFF: 200; 5 AEROSOL RESPIRATORY (INHALATION) at 08:06

## 2017-12-19 RX ADMIN — ACETAMINOPHEN PRN MG: 325 TABLET ORAL at 04:36

## 2017-12-19 RX ADMIN — ALBUTEROL SULFATE SCH MG: 2.5 SOLUTION RESPIRATORY (INHALATION) at 08:06

## 2017-12-19 RX ADMIN — CODEINE SULFATE PRN MG: 15 TABLET ORAL at 04:37

## 2017-12-19 RX ADMIN — GLYCERIN PRN DROP: .002; .002; .01 SOLUTION/ DROPS OPHTHALMIC at 00:25

## 2017-12-19 RX ADMIN — GUAIFENESIN SCH MG: 600 TABLET, EXTENDED RELEASE ORAL at 08:44

## 2017-12-19 RX ADMIN — GLYCERIN PRN DROP: .002; .002; .01 SOLUTION/ DROPS OPHTHALMIC at 08:51

## 2017-12-19 RX ADMIN — TIOTROPIUM BROMIDE SCH CAP: 18 CAPSULE ORAL; RESPIRATORY (INHALATION) at 08:06

## 2017-12-19 RX ADMIN — ALBUTEROL SULFATE SCH: 2.5 SOLUTION RESPIRATORY (INHALATION) at 01:20

## 2017-12-19 RX ADMIN — BENZONATATE SCH MG: 100 CAPSULE ORAL at 08:44

## 2017-12-19 RX ADMIN — METHYLPREDNISOLONE SODIUM SUCCINATE SCH MG: 40 INJECTION, POWDER, FOR SOLUTION INTRAMUSCULAR; INTRAVENOUS at 08:49

## 2017-12-19 RX ADMIN — OMEPRAZOLE SCH MG: 20 CAPSULE, DELAYED RELEASE ORAL at 08:44

## 2017-12-19 RX ADMIN — ALBUTEROL SULFATE SCH: 2.5 SOLUTION RESPIRATORY (INHALATION) at 13:10

## 2017-12-19 RX ADMIN — FLUTICASONE PROPIONATE SCH SPRAY: 50 SPRAY, METERED NASAL at 08:50

## 2017-12-19 RX ADMIN — SODIUM CHLORIDE SCH MLS/HR: 900 IRRIGANT IRRIGATION at 04:35

## 2017-12-19 RX ADMIN — GLYCERIN PRN DROP: .002; .002; .01 SOLUTION/ DROPS OPHTHALMIC at 04:35

## 2017-12-20 NOTE — DS
CC:  Dr. Soto; Dr. Mccoy *

 

DISCHARGE SUMMARY:

 

DATE OF ADMISSION:  12/18/17

 

DATE OF DISCHARGE:  12/19/17

 

PRIMARY CARE PROVIDERS:  Dr. Cammy Soto and Dr. Mccoy.

 

PRIMARY DIAGNOSIS:  Bronchitis.

 

SECONDARY DIAGNOSES:  Include:

1.  Pulmonary fibrosis/hypersensitivity pneumonitis with chronic respiratory 
failure, requiring 3 L oxygen.

2.  Gastroesophageal reflux disease.

3.  Anxiety/depression.

4.  Osteoporosis.

 

MEDICATIONS AT DISCHARGE:  Include:

1.  Omeprazole 20 mg in the morning.

2.  Probiotic 1 tablet daily.

3.  Symbicort 160/4.5 two puffs twice daily.

4.  Sertraline 150 mg in the evening.

5.  Risedronate 1 tab weekly.

6.  Prednisone 50 mg daily for 5 additional days.

7.  Guaifenesin ER 1200 mg twice daily.

8.  Fluticasone 2 sprays both nares twice daily.

9.  Tessalon caps 100 mg 3 times daily for 5 additional days.

10.  Combivent one inhalation every 4 hours as needed for shortness of breath.

 

PERTINENT IMAGING STUDIES:  Chest x-ray on presentation, worsening airspace 
disease, could represent a progression of pulmonary fibrosis and/or cardiogenic 
pulmonary edema on a background of underlying pulmonary fibrosis.

 

PERTINENT LABORATORY DATA:  White blood cell count 6.8 on presentation and 4.5 
on discharge.  D-dimer less than 200.  BNP 84.  Lactic acid 1.1.  Influenza A 
and B are negative.

 

HISTORY OF PRESENT ILLNESS AND HOSPITAL COURSE:  This is a 67-year-old female 
with past medical history of hypersensitivity pneumonitis with restrictive lung 
profile on PFTs, although some evidence of airway reversibility with 
bronchodilators complicated by chronic respiratory failure and had increasing 
shortness of breath over the last several weeks, treated with azithromycin and 
steroids, had no improvement, presented to the hospital with increasing cough 
as well as shortness of breath.  She notes that her cough is so bad, that her 
throat is significantly painful.  She was admitted to the hospital.  She also 
had noted that she had been around people that had previously been sick, but do 
not think that they were contagious anymore.  In general, she tries to avoid 
public spaces to avoid darwin illnesses.  On the first day of the hospital 
stay, her largest complaint was cough and sore throat.  She was treated with 
codeine, viscous lidocaine, Tessalon caps, guaifenesin, so we will start her on 
fluticasone for sinus congestion.  On the day of discharge was significantly 
improved.  Her cough had resolved.  She no longer had a sore throat and she 
felt her breathing was back to her baseline.  I do suspect she had a viral 
upper respiratory tract infection, potentially lower airways involving to be 
classified as bronchitis, but I doubt she had pneumonia.  She had no evidence 
of active bacterial infection.  She will be discharged from 5 additional days 
of prednisone in addition to the 2 days of IV steroids received in the hospital 
in order to treat any active inflammation that may be residual from potentially 
viral source.

 

At followup, please;

1.  Evaluate for continued resolution, prolonged steroid course if necessary.

2.  No other specific labs or vitals that need followup.

 

Reasons to return to the hospital included but not limited to recurrent or 
worsening symptoms, worsening shortness of breath, chest pain, nausea, vomiting
, lightheadedness, loss of consciousness, near loss of consciousness, bleeding 
from any source, or inability to obtain or tolerate medications discussed with 
the patient.  She acknowledged understanding.

 

TIME SPENT:  Greater than 60 minutes was spent in the discharge of this patient
, greater than half was spent face-to-face with the patient.

 

 [id number]/CPS #: [id number]

ALICIA

## 2018-04-25 ENCOUNTER — HOSPITAL ENCOUNTER (INPATIENT)
Dept: HOSPITAL 25 - ICU | Age: 68
LOS: 5 days | Discharge: ANOTHER HOSPITAL | DRG: 193 | End: 2018-04-30
Attending: HOSPITALIST | Admitting: INTERNAL MEDICINE
Payer: MEDICARE

## 2018-04-25 DIAGNOSIS — Z79.899: ICD-10-CM

## 2018-04-25 DIAGNOSIS — J96.21: ICD-10-CM

## 2018-04-25 DIAGNOSIS — J13: Primary | ICD-10-CM

## 2018-04-25 DIAGNOSIS — I10: ICD-10-CM

## 2018-04-25 DIAGNOSIS — J84.112: ICD-10-CM

## 2018-04-25 DIAGNOSIS — Z79.52: ICD-10-CM

## 2018-04-25 DIAGNOSIS — Z79.2: ICD-10-CM

## 2018-04-25 LAB
HCT VFR BLD AUTO: 30 % (ref 35–47)
HGB BLD-MCNC: 10 G/DL (ref 12–16)
MCH RBC QN AUTO: 28 PG (ref 27–31)
MCHC RBC AUTO-ENTMCNC: 33 G/DL (ref 31–36)
MCV RBC AUTO: 86 FL (ref 80–97)
PLATELET # BLD AUTO: 125 10^3/UL (ref 150–450)
RBC # BLD AUTO: 3.54 10^6/UL (ref 4–5.4)
WBC # BLD AUTO: 14.5 10^3/UL (ref 3.5–10.8)

## 2018-04-25 PROCEDURE — 83735 ASSAY OF MAGNESIUM: CPT

## 2018-04-25 PROCEDURE — 94760 N-INVAS EAR/PLS OXIMETRY 1: CPT

## 2018-04-25 PROCEDURE — 80202 ASSAY OF VANCOMYCIN: CPT

## 2018-04-25 PROCEDURE — 85610 PROTHROMBIN TIME: CPT

## 2018-04-25 PROCEDURE — 87641 MR-STAPH DNA AMP PROBE: CPT

## 2018-04-25 PROCEDURE — 36415 COLL VENOUS BLD VENIPUNCTURE: CPT

## 2018-04-25 PROCEDURE — 71045 X-RAY EXAM CHEST 1 VIEW: CPT

## 2018-04-25 PROCEDURE — 80048 BASIC METABOLIC PNL TOTAL CA: CPT

## 2018-04-25 PROCEDURE — 94640 AIRWAY INHALATION TREATMENT: CPT

## 2018-04-25 PROCEDURE — 85025 COMPLETE CBC W/AUTO DIFF WBC: CPT

## 2018-04-25 PROCEDURE — 87040 BLOOD CULTURE FOR BACTERIA: CPT

## 2018-04-25 PROCEDURE — 85027 COMPLETE CBC AUTOMATED: CPT

## 2018-04-25 PROCEDURE — 84145 PROCALCITONIN (PCT): CPT

## 2018-04-25 PROCEDURE — 80053 COMPREHEN METABOLIC PANEL: CPT

## 2018-04-25 RX ADMIN — IPRATROPIUM BROMIDE AND ALBUTEROL SULFATE SCH NEB: .5; 3 SOLUTION RESPIRATORY (INHALATION) at 14:53

## 2018-04-25 RX ADMIN — GUAIFENESIN AND CODEINE PHOSPHATE PRN ML: 100; 10 SOLUTION ORAL at 20:51

## 2018-04-25 RX ADMIN — MORPHINE SULFATE PRN MG: 4 INJECTION INTRAVENOUS at 23:50

## 2018-04-25 RX ADMIN — IPRATROPIUM BROMIDE AND ALBUTEROL SULFATE SCH NEB: .5; 3 SOLUTION RESPIRATORY (INHALATION) at 20:32

## 2018-04-25 RX ADMIN — ENOXAPARIN SODIUM SCH: 40 INJECTION SUBCUTANEOUS at 14:38

## 2018-04-25 RX ADMIN — GUAIFENESIN AND CODEINE PHOSPHATE PRN ML: 100; 10 SOLUTION ORAL at 14:39

## 2018-04-25 RX ADMIN — SERTRALINE SCH MG: 50 TABLET, FILM COATED ORAL at 21:34

## 2018-04-25 RX ADMIN — OMEPRAZOLE SCH MG: 20 CAPSULE, DELAYED RELEASE ORAL at 20:53

## 2018-04-25 NOTE — RAD
HISTORY: Pneumonia, history of pulmonary fibrosis



COMPARISONS: December 17, 2017



VIEWS: 1: frontal portable view of the chest at 1:40 PM



FINDINGS:

LINES AND TUBES: None.

CARDIOMEDIASTINAL SILHOUETTE: The cardiomediastinal silhouette is stable.

PLEURA: The costophrenic angles are sharp. No pleural abnormalities are noted.

LUNG PARENCHYMA: Again noted is diffuse coarse reticular opacification throughout both

lungs. This is similar to the previous examination.

ABDOMEN: The upper abdomen is clear. There is no subphrenic gas.

BONES AND SOFT TISSUES: No bone or soft tissue abnormalities are noted.



IMPRESSION: DIFFUSE INTERSTITIAL DISEASE, SIMILAR TO THE DECEMBER 17, 2017 EXAMINATION

## 2018-04-25 NOTE — HP
ADMISSION HISTORY AND PHYSICAL:

 

DATE OF ADMISSION:  18

 

REASON FOR ADMISSION:  Respiratory insufficiency.

 

HISTORY OF PRESENT ILLNESS:  This patient is a 68-year-old female with a 
history of pulmonary fibrosis who presented to [facility] yesterday with 
a 3 to 4 day history of increasing cough and shortness of breath.  Pertinent 
positives while at the [facility] include a blood culture that was 
positive for gram-positive cocci, white count of 18.6, temp of 103.6.  Chest x-
ray showing bilateral infiltrates and lactic acid of 2.0, repeat 3.9 and 
arterial O2 saturation of 84% on 6 L nasal cannula.  The patient had increasing 
oxygen requirements while at [facility] and was transferred to the ICU at [facility].  Of note, the patient is being evaluated for a lung transplant 
at [facility], and is scheduled for a coronary artery stent on 18, in 
preparation for being placed on a transplant list.

 

MEDICATIONS:  Meds at [facility] include the followin.  Azithromycin 500 mg IV once.

2.  Ceftriaxone 1 g IV once.

3.  Methylprednisolone 125 mg once.

4.  DuoNeb inhalations q.6 hours.

5.  Prednisone 10 mg p.o. t.i.d.

6.  Pantoprazole 40 mg p.o. daily.

7.  Symbicort 2 puffs twice daily.

8.  Carisoprodol 350 mg daily.

9.  Xanax 0.25 mg q.6 hours p.r.n. anxiety.

 

ALLERGIES:  There are no known drug allergies.

 

REVIEW OF SYSTEMS:  Noncontributory.

 

                               PHYSICAL EXAMINATION

 

GENERAL:  The patient was tachypneic but was able to complete sentences. She 
did cough frequently duing the exam. 

 

VITAL SIGNS:  Temp 98.2 (temporal), heart rate 85 and regular, respirations 26, 
O2 saturation 98% on OxyMask with O2 at 10 L per minute, blood pressure 113/50.

 

HEENT:  Oropharynx clear. No lymphadenopathy.

 

NECK:  Supple.

 

THORAX:  Diffuse crackles in both lungs posteriorly, without audible wheezes.

 

CARDIAC:  No murmurs or rubs appreciated.

 

ABDOMEN:  Not distended.

 

EXTREMITIES:  Warm, not cyanotic and not edematous.

 

NEUROLOGIC:  The patient was alert and oriented, and moved all extremities 
equally.

 

 DIAGNOSTIC STUDIES/LAB DATA:  Hemoglobin of 10, white count 14.5, platelets 
125 K.  Sodium 142, potassium 3.6, chloride 111, CO2 26, BUN 12, creatinine 0.54
, glucose 84.  Total protein 5.5, albumin 3.3.  Liver enzymes normal.  Chest x-
ray showed diffuse infiltrates, mostly peribronchial. No prior film for 
comparison.  ECG pending.

 

IMPRESSION:  This patient with advanced pulmonary interstitial fibrosis has 
apparently acquired a gram-positive community-acquired pneumonia. Considering 
the bacteremia, the stent placement scheduled for tomorrow will need to be 
postponed.

 

MANAGEMENT PLAN:

1.  Vancomycin, pending results of the blood cultures.

2.  Maintain O2 sats in the low 90s and try to limit inhaled O2.

3.  Reinstate the patient's chronic medications, including prednisone (will 
give at 40 mg daily).  I have been in touch with the patient's transplant 
coordinator at [facility] (Dilia Ribeiro), and will provide regular updates. No 
need for transfer to [facility] at this time. 

 

TIME SPENT:  Critical care time: 60 minutes.

 

 [id number]/CPS #: [id number]

[id number]

## 2018-04-26 LAB
HCT VFR BLD AUTO: 33 % (ref 35–47)
HGB BLD-MCNC: 11.1 G/DL (ref 12–16)
INR PPP/BLD: 0.93 (ref 0.77–1.02)
MCH RBC QN AUTO: 29 PG (ref 27–31)
MCHC RBC AUTO-ENTMCNC: 33 G/DL (ref 31–36)
MCV RBC AUTO: 86 FL (ref 80–97)
PLATELET # BLD AUTO: 142 10^3/UL (ref 150–450)
RBC # BLD AUTO: 3.87 10^6/UL (ref 4–5.4)
WBC # BLD AUTO: 13 10^3/UL (ref 3.5–10.8)

## 2018-04-26 RX ADMIN — IPRATROPIUM BROMIDE AND ALBUTEROL SULFATE SCH NEB: .5; 3 SOLUTION RESPIRATORY (INHALATION) at 09:33

## 2018-04-26 RX ADMIN — IPRATROPIUM BROMIDE AND ALBUTEROL SULFATE SCH: .5; 3 SOLUTION RESPIRATORY (INHALATION) at 12:58

## 2018-04-26 RX ADMIN — HYDROCHLOROTHIAZIDE SCH MG: 25 TABLET ORAL at 08:34

## 2018-04-26 RX ADMIN — OMEPRAZOLE SCH MG: 20 CAPSULE, DELAYED RELEASE ORAL at 21:23

## 2018-04-26 RX ADMIN — QUETIAPINE FUMARATE SCH MG: 200 TABLET ORAL at 21:25

## 2018-04-26 RX ADMIN — CEFTRIAXONE SODIUM SCH MLS/HR: 2 INJECTION, POWDER, FOR SOLUTION INTRAVENOUS at 11:38

## 2018-04-26 RX ADMIN — MONTELUKAST SODIUM SCH MG: 10 TABLET, COATED ORAL at 08:34

## 2018-04-26 RX ADMIN — SERTRALINE SCH: 50 TABLET, FILM COATED ORAL at 08:34

## 2018-04-26 RX ADMIN — IPRATROPIUM BROMIDE AND ALBUTEROL SULFATE SCH: .5; 3 SOLUTION RESPIRATORY (INHALATION) at 07:51

## 2018-04-26 RX ADMIN — ALPRAZOLAM PRN MG: 0.25 TABLET ORAL at 21:23

## 2018-04-26 RX ADMIN — PREDNISONE SCH MG: 20 TABLET ORAL at 08:35

## 2018-04-26 RX ADMIN — ENOXAPARIN SODIUM SCH MG: 40 INJECTION SUBCUTANEOUS at 14:39

## 2018-04-26 RX ADMIN — OMEPRAZOLE SCH MG: 20 CAPSULE, DELAYED RELEASE ORAL at 08:34

## 2018-04-26 RX ADMIN — IPRATROPIUM BROMIDE AND ALBUTEROL SULFATE SCH: .5; 3 SOLUTION RESPIRATORY (INHALATION) at 03:24

## 2018-04-26 RX ADMIN — MORPHINE SULFATE PRN MG: 4 INJECTION INTRAVENOUS at 02:55

## 2018-04-26 RX ADMIN — MORPHINE SULFATE PRN MG: 4 INJECTION INTRAVENOUS at 21:23

## 2018-04-26 RX ADMIN — IPRATROPIUM BROMIDE AND ALBUTEROL SULFATE SCH NEB: .5; 3 SOLUTION RESPIRATORY (INHALATION) at 20:00

## 2018-04-26 RX ADMIN — IPRATROPIUM BROMIDE AND ALBUTEROL SULFATE SCH NEB: .5; 3 SOLUTION RESPIRATORY (INHALATION) at 15:49

## 2018-04-26 RX ADMIN — ALPRAZOLAM PRN MG: 0.25 TABLET ORAL at 08:48

## 2018-04-26 RX ADMIN — VALSARTAN SCH MG: 160 TABLET ORAL at 08:34

## 2018-04-26 RX ADMIN — BUDESONIDE AND FORMOTEROL FUMARATE DIHYDRATE SCH PUFF: 160; 4.5 AEROSOL RESPIRATORY (INHALATION) at 19:58

## 2018-04-26 RX ADMIN — GUAIFENESIN AND CODEINE PHOSPHATE PRN ML: 100; 10 SOLUTION ORAL at 04:56

## 2018-04-26 RX ADMIN — GUAIFENESIN AND CODEINE PHOSPHATE PRN ML: 100; 10 SOLUTION ORAL at 12:34

## 2018-04-26 RX ADMIN — MORPHINE SULFATE PRN MG: 4 INJECTION INTRAVENOUS at 05:23

## 2018-04-26 NOTE — PN
Date of Service: 04/26/18


Critical Care Services: 


All blood cultures from yesterday growing Streptococcus pneumoniae - Is now on 

ceftriaxone (2 gm IV daily) - Since admission, O2 requirement has gone from 10 

to 15 L/min, but clinical condition is otherwise unchanged. She continues to 

have a troublesome cough.





Vital Signs: 











Temp Pulse Resp BP SpO2 FiO2


 


97.4 F 84 22 125/86 93 15L/m











Physical Exam: 


Gen:Alert, oriented. Tachypneic, but not "in extremis"


Lungs: crackles throughout both lungs


Cardiac:  Reg rhythm


Abdomen: Not distended.


Extremities: Warm. No cyanosis or edema.





Fluid Balance (Past 24 Hours): 











 04/26/18





 06:59


 


Intake Total 1374


 


Output Total 600


 


Balance 774


 


Weight 139 lb 


 


Intake: 


 


  IV Fluids 361


 


    NS (0.9%) 361


 


  IVPB 113


 


    ABX - ZOSYN 113


 


  Oral 900


 


Output: 


 


  Urine 500


 


  Gomez 100


 


Other: 


 


  Date of Last Bowel 4/25/48





  Movement 


 


  # Bowel Movements 1


 


  Estimated Stool Amount Large





 








Labs: 











  04/26/18 04/26/18





  05:30 05:30


 


WBC   13.0 


 


Hgb   11.1 


 


Hct   33 


 


Plt Count   142 


 


INR (Anticoag Therapy)  0.93


 


  


 


Sodium  140 


 


Potassium  3.7 


 


Chloride  108 


 


Carbon Dioxide  26 


 


BUN  11 


 


Creatinine  0.64 


 


Glucose  100 


 


Calcium  8.2  











Studies: 


None today





Nutrition: 


Oral diet





Impression: 


Bacteremic pneumococcal pneumonia in a patient with end-stage pulmonary 

fibrosis.





Plan: 


I have spoken with transplant team at [facility] (Dr. Grayson Mancia of 

pulmonary service, and Dilia Ribeiro, the transplant coordinator) and they will 

accept the patient in transfer when the clinical condition has stabilized (i.e.

, increasing O2 requirement is concerning). I will update them daily until 

transfer is feasible. In meantime, will adjust inhaled O2 to maintain SpO2 of 88

-92%, and will continue IV ceftriaxone at 2 gm daily.





Patient is aware of the current situation, and our management plan.





Critical Care Time: 40 minutes (not including time spent discussing case with 

[facility]).

## 2018-04-27 LAB
HCT VFR BLD AUTO: 31 % (ref 35–47)
HGB BLD-MCNC: 10.5 G/DL (ref 12–16)
MCH RBC QN AUTO: 29 PG (ref 27–31)
MCHC RBC AUTO-ENTMCNC: 34 G/DL (ref 31–36)
MCV RBC AUTO: 86 FL (ref 80–97)
PLATELET # BLD AUTO: 130 10^3/UL (ref 150–450)
RBC # BLD AUTO: 3.64 10^6/UL (ref 4–5.4)
WBC # BLD AUTO: 8.2 10^3/UL (ref 3.5–10.8)

## 2018-04-27 RX ADMIN — VALSARTAN SCH MG: 160 TABLET ORAL at 09:30

## 2018-04-27 RX ADMIN — IPRATROPIUM BROMIDE AND ALBUTEROL SULFATE SCH NEB: .5; 3 SOLUTION RESPIRATORY (INHALATION) at 12:53

## 2018-04-27 RX ADMIN — IPRATROPIUM BROMIDE AND ALBUTEROL SULFATE SCH NEB: .5; 3 SOLUTION RESPIRATORY (INHALATION) at 20:10

## 2018-04-27 RX ADMIN — BUDESONIDE AND FORMOTEROL FUMARATE DIHYDRATE SCH PUFF: 160; 4.5 AEROSOL RESPIRATORY (INHALATION) at 09:34

## 2018-04-27 RX ADMIN — Medication SCH TAB: at 13:32

## 2018-04-27 RX ADMIN — ALPRAZOLAM PRN MG: 0.25 TABLET ORAL at 13:32

## 2018-04-27 RX ADMIN — OMEPRAZOLE SCH MG: 20 CAPSULE, DELAYED RELEASE ORAL at 09:30

## 2018-04-27 RX ADMIN — HYDROCHLOROTHIAZIDE SCH MG: 25 TABLET ORAL at 09:30

## 2018-04-27 RX ADMIN — CEFTRIAXONE SODIUM SCH MLS/HR: 2 INJECTION, POWDER, FOR SOLUTION INTRAVENOUS at 11:45

## 2018-04-27 RX ADMIN — IPRATROPIUM BROMIDE AND ALBUTEROL SULFATE SCH NEB: .5; 3 SOLUTION RESPIRATORY (INHALATION) at 09:34

## 2018-04-27 RX ADMIN — IPRATROPIUM BROMIDE AND ALBUTEROL SULFATE SCH: .5; 3 SOLUTION RESPIRATORY (INHALATION) at 03:41

## 2018-04-27 RX ADMIN — POTASSIUM CHLORIDE SCH MEQ: 1500 TABLET, EXTENDED RELEASE ORAL at 20:01

## 2018-04-27 RX ADMIN — BUDESONIDE AND FORMOTEROL FUMARATE DIHYDRATE SCH PUFF: 160; 4.5 AEROSOL RESPIRATORY (INHALATION) at 20:10

## 2018-04-27 RX ADMIN — Medication SCH TAB: at 20:01

## 2018-04-27 RX ADMIN — ENOXAPARIN SODIUM SCH MG: 40 INJECTION SUBCUTANEOUS at 16:03

## 2018-04-27 RX ADMIN — QUETIAPINE FUMARATE SCH MG: 200 TABLET ORAL at 20:01

## 2018-04-27 RX ADMIN — PREDNISONE SCH MG: 20 TABLET ORAL at 09:31

## 2018-04-27 RX ADMIN — SERTRALINE SCH MG: 50 TABLET, FILM COATED ORAL at 09:31

## 2018-04-27 RX ADMIN — MONTELUKAST SODIUM SCH MG: 10 TABLET, COATED ORAL at 09:31

## 2018-04-27 RX ADMIN — FAMOTIDINE SCH MG: 20 TABLET, FILM COATED ORAL at 20:01

## 2018-04-27 NOTE — PN
Date of Service: 04/27/18


Critical Care Services: 


Had an uneventful evening. This AM, is oxygenating adequately (SpO2> 90%) on O2 

by aerosol mask at 5 L/min.





Vital Signs: 











Temp Pulse Resp BP SpO2 FiO2


 


97.5 F 99 24 151/73 96 











Physical Exam: 


Gen:Alert, oriented. Tachypneic but no respiratory distress.


Lungs: Scattered crackles in both lungs. No change from prior exams.


Extremities:No cyanosis or edema.





Fluid Balance (Past 24 Hours): 











 04/27/18





 06:59


 


Intake Total 1990


 


Output Total 2850


 


Balance -860


 


Weight 136 lb 


 


Intake: 


 


  IV Fluids 1090


 


    NS (0.9%) 1090


 


  IVPB 


 


    ABX - ZOSYN 


 


  Oral 900


 


Output: 


 


  Urine 2850


 


  Gomez 


 


Other: 


 


  Date of Last Bowel 





  Movement 


 


  # Bowel Movements 


 


  Estimated Stool Amount Medium


 


  # Voids 





 





Labs: 











  04/27/18 04/27/18





  06:00 06:00


 


WBC   8.2


 


Hgb   10.5 


 


Hct   31 


 


MCV   86


 


Plt Count   130 


 


  


 


Sodium  141 


 


Potassium  3.3  


 


Chloride  104 


 


Carbon Dioxide  31 


 


BUN  9 


 


Creatinine  0.60 


 


Glucose  92 


 


Calcium  8.7 


 


Procalcitonin  0.6











Studies: 


None today





Nutrition: 


Oral diet - intake adequate.





Impression: 


Has improved (i.e., has defervesced, leukocytosis has resolved, and O2 

requirement has declined). 





Plan: 


Continue IV ceftriaxone for now. Although procalcitonin is not high, would 

probably give at least 5-7 days of IV antibiotics because of the bacteremia. 

Will also replace potassium and magnesium.





I have spoken with Dr. Agosto at [facility] and we both agree that patient 

can be safely transferred when a bed is available.

## 2018-04-28 RX ADMIN — SERTRALINE SCH MG: 50 TABLET, FILM COATED ORAL at 09:30

## 2018-04-28 RX ADMIN — IPRATROPIUM BROMIDE AND ALBUTEROL SULFATE SCH NEB: .5; 3 SOLUTION RESPIRATORY (INHALATION) at 19:44

## 2018-04-28 RX ADMIN — IPRATROPIUM BROMIDE AND ALBUTEROL SULFATE SCH NEB: .5; 3 SOLUTION RESPIRATORY (INHALATION) at 08:17

## 2018-04-28 RX ADMIN — Medication SCH TAB: at 09:28

## 2018-04-28 RX ADMIN — FAMOTIDINE SCH MG: 20 TABLET, FILM COATED ORAL at 20:40

## 2018-04-28 RX ADMIN — IPRATROPIUM BROMIDE AND ALBUTEROL SULFATE SCH NEB: .5; 3 SOLUTION RESPIRATORY (INHALATION) at 13:46

## 2018-04-28 RX ADMIN — PREDNISONE SCH MG: 20 TABLET ORAL at 09:30

## 2018-04-28 RX ADMIN — ALPRAZOLAM PRN MG: 0.25 TABLET ORAL at 20:39

## 2018-04-28 RX ADMIN — FAMOTIDINE SCH MG: 20 TABLET, FILM COATED ORAL at 09:29

## 2018-04-28 RX ADMIN — VALSARTAN SCH MG: 160 TABLET ORAL at 09:29

## 2018-04-28 RX ADMIN — IPRATROPIUM BROMIDE AND ALBUTEROL SULFATE SCH: .5; 3 SOLUTION RESPIRATORY (INHALATION) at 01:13

## 2018-04-28 RX ADMIN — ENOXAPARIN SODIUM SCH MG: 40 INJECTION SUBCUTANEOUS at 14:35

## 2018-04-28 RX ADMIN — POTASSIUM CHLORIDE SCH MEQ: 1500 TABLET, EXTENDED RELEASE ORAL at 20:39

## 2018-04-28 RX ADMIN — LIDOCAINE HYDROCHLORIDE PRN APPLIC: 20 JELLY TOPICAL at 20:40

## 2018-04-28 RX ADMIN — Medication SCH TAB: at 20:40

## 2018-04-28 RX ADMIN — QUETIAPINE FUMARATE SCH MG: 200 TABLET ORAL at 20:39

## 2018-04-28 RX ADMIN — GUAIFENESIN AND CODEINE PHOSPHATE PRN ML: 100; 10 SOLUTION ORAL at 16:47

## 2018-04-28 RX ADMIN — BUDESONIDE AND FORMOTEROL FUMARATE DIHYDRATE SCH PUFF: 160; 4.5 AEROSOL RESPIRATORY (INHALATION) at 08:17

## 2018-04-28 RX ADMIN — CEFTRIAXONE SODIUM SCH MLS/HR: 2 INJECTION, POWDER, FOR SOLUTION INTRAVENOUS at 12:07

## 2018-04-28 RX ADMIN — GUAIFENESIN AND CODEINE PHOSPHATE PRN ML: 100; 10 SOLUTION ORAL at 05:30

## 2018-04-28 RX ADMIN — MONTELUKAST SODIUM SCH MG: 10 TABLET, COATED ORAL at 09:29

## 2018-04-28 RX ADMIN — POTASSIUM CHLORIDE SCH MEQ: 1500 TABLET, EXTENDED RELEASE ORAL at 09:30

## 2018-04-28 RX ADMIN — BUDESONIDE AND FORMOTEROL FUMARATE DIHYDRATE SCH PUFF: 160; 4.5 AEROSOL RESPIRATORY (INHALATION) at 19:44

## 2018-04-28 RX ADMIN — HYDROCHLOROTHIAZIDE SCH MG: 25 TABLET ORAL at 09:28

## 2018-04-28 NOTE — PN
Date of Service: 04/28/18


Critical Care Services: 


Had an uneventful evening. Remains on nasal O2 at 5 L/min with SpO2s in the low 

90s.





Vital Signs: 











Temp Pulse Resp BP SpO2 FiO2


 


97.4 F 80 25 166/91 98 











Physical Exam: 


Gen:Alert, oriented, no respiratory distress


Lungs: Diffuse crackles on both sides.


Extremities: No cyanosis or edema





Fluid Balance (Past 24 Hours): 











 04/28/18





 06:59


 


Intake Total 2080


 


Output Total 3650


 


Balance -1570


 


Weight 134 lb 


 


Intake: 


 


  IV Fluids 108


 


    NS (0.9%) 108


 


  IVPB 172


 


    ABX - ZOSYN 


 


    NS (0.9%) 172


 


  Oral 1800


 


Output: 


 


  Urine 3650


 


  Gomez 


 


Other: 


 


  Estimated Void Large


 


  Date of Last Bowel 





  Movement 


 


  # Bowel Movements 1


 


  Estimated Stool Amount Small


 


  # Voids 2





 











Labs: 











  04/28/18





  05:16


 


Sodium  142


 


Potassium  3.5


 


Chloride  104


 


Carbon Dioxide  32


 


Anion Gap  6


 


BUN  11


 


Creatinine  0.56


 


Glucose  87


 


Calcium  9.0


 


Magnesium  2.3











Studies: 


None today





Nutrition: 


Oral feeding





Impression: 


Continues to improve. Awaiting a bed at [facility].





Plan: 


Continue IV antibiotics (day 4). (Probably should give at least 5 days of 

pareneteral antibiotics because of bacteremia).


Patient can go to floor soon.

## 2018-04-29 RX ADMIN — ALPRAZOLAM PRN MG: 0.25 TABLET ORAL at 15:10

## 2018-04-29 RX ADMIN — QUETIAPINE FUMARATE SCH MG: 200 TABLET ORAL at 21:24

## 2018-04-29 RX ADMIN — POTASSIUM CHLORIDE SCH MEQ: 1500 TABLET, EXTENDED RELEASE ORAL at 21:24

## 2018-04-29 RX ADMIN — ENOXAPARIN SODIUM SCH MG: 40 INJECTION SUBCUTANEOUS at 15:10

## 2018-04-29 RX ADMIN — BUDESONIDE AND FORMOTEROL FUMARATE DIHYDRATE SCH PUFF: 160; 4.5 AEROSOL RESPIRATORY (INHALATION) at 19:36

## 2018-04-29 RX ADMIN — LIDOCAINE HYDROCHLORIDE PRN APPLIC: 20 JELLY TOPICAL at 21:42

## 2018-04-29 RX ADMIN — CEFTRIAXONE SODIUM SCH MLS/HR: 2 INJECTION, POWDER, FOR SOLUTION INTRAVENOUS at 11:15

## 2018-04-29 RX ADMIN — LIDOCAINE HYDROCHLORIDE PRN APPLIC: 20 JELLY TOPICAL at 06:31

## 2018-04-29 RX ADMIN — MONTELUKAST SODIUM SCH MG: 10 TABLET, COATED ORAL at 09:54

## 2018-04-29 RX ADMIN — POTASSIUM CHLORIDE SCH MEQ: 1500 TABLET, EXTENDED RELEASE ORAL at 09:54

## 2018-04-29 RX ADMIN — IPRATROPIUM BROMIDE AND ALBUTEROL SULFATE SCH NEB: .5; 3 SOLUTION RESPIRATORY (INHALATION) at 13:01

## 2018-04-29 RX ADMIN — FAMOTIDINE SCH MG: 20 TABLET, FILM COATED ORAL at 21:24

## 2018-04-29 RX ADMIN — HYDROCHLOROTHIAZIDE SCH MG: 25 TABLET ORAL at 09:55

## 2018-04-29 RX ADMIN — BUDESONIDE AND FORMOTEROL FUMARATE DIHYDRATE SCH PUFF: 160; 4.5 AEROSOL RESPIRATORY (INHALATION) at 07:20

## 2018-04-29 RX ADMIN — IPRATROPIUM BROMIDE AND ALBUTEROL SULFATE SCH NEB: .5; 3 SOLUTION RESPIRATORY (INHALATION) at 07:20

## 2018-04-29 RX ADMIN — PREDNISONE SCH MG: 20 TABLET ORAL at 09:55

## 2018-04-29 RX ADMIN — FAMOTIDINE SCH MG: 20 TABLET, FILM COATED ORAL at 09:54

## 2018-04-29 RX ADMIN — VALSARTAN SCH MG: 160 TABLET ORAL at 09:55

## 2018-04-29 RX ADMIN — Medication SCH TAB: at 21:24

## 2018-04-29 RX ADMIN — IPRATROPIUM BROMIDE AND ALBUTEROL SULFATE SCH: .5; 3 SOLUTION RESPIRATORY (INHALATION) at 01:13

## 2018-04-29 RX ADMIN — SERTRALINE SCH MG: 50 TABLET, FILM COATED ORAL at 09:55

## 2018-04-29 RX ADMIN — IPRATROPIUM BROMIDE AND ALBUTEROL SULFATE SCH NEB: .5; 3 SOLUTION RESPIRATORY (INHALATION) at 19:35

## 2018-04-29 RX ADMIN — LIDOCAINE HYDROCHLORIDE PRN APPLIC: 20 JELLY TOPICAL at 18:37

## 2018-04-29 RX ADMIN — LIDOCAINE HYDROCHLORIDE PRN APPLIC: 20 JELLY TOPICAL at 02:25

## 2018-04-29 RX ADMIN — Medication SCH TAB: at 09:55

## 2018-04-29 RX ADMIN — GUAIFENESIN AND CODEINE PHOSPHATE PRN ML: 100; 10 SOLUTION ORAL at 21:42

## 2018-04-29 RX ADMIN — LIDOCAINE HYDROCHLORIDE PRN APPLIC: 20 JELLY TOPICAL at 11:31

## 2018-04-29 RX ADMIN — GUAIFENESIN AND CODEINE PHOSPHATE PRN ML: 100; 10 SOLUTION ORAL at 15:10

## 2018-04-29 RX ADMIN — GUAIFENESIN AND CODEINE PHOSPHATE PRN ML: 100; 10 SOLUTION ORAL at 02:04

## 2018-04-29 NOTE — DS
67 y/o female with end-stage pulmonary fibrosis who was admitted on 4/25 with 

bacteremic pneumococcal pneumonia and increased O2 requirements (was originally 

on high-flow humidified O2 at 15-20 L/min). CXR showed diffuse pulmonary 

infiltrates, so it was difficult to localize the pneumonia. Has been treated 

with ceftriaxone (2 gm IV daily) and has shown steady improvement; i.e., no 

fever since 4/25, leukocytosis has resolved, O2 requirement has decreased to 5 L

/min, and procalcitonin level (4/28) is down to 0.6. Our plan is to give at 

least 5 days of IV antibiotics (because of the bacteremia), then complete the 

treatment with PO meds, and reculture when antibiotic Rx is completed.





Patient is being evaluated for a lung transplant at [facility], and will be 

transferred there for further management.


On day of transfer, patient was up and eating a full breakfast. Vital Signs: T=

98, QY=191/70, P=72 (regular), RR=18, SpO2 = 96% on nasal O2 at 5 L/min. 





Final Diagnoses:


1. Community-acquired, bacteremic, pneumococcal pneumonia - possibly involving 

both lungs.


2. Advanced, idiopathic pulmonary fibrosis


3. ? Reversible airways disease


4. Hypoxemic respiratory failure: acute-on-chronic


5. Hypertension





Meds on Transfer:


Ceftriaxone, 2 gm IV daily


Prednisone: 40 mg PO daily


Losartan/HCTZ: 160/12.5 mg PO daily


Albuterol/Ipatropium nebs q 6h


Symbicort (160/4.5) 2 puffs BID


Singulair: 10 mg PO daily


Enoxaparin: 40 mg subQ daily


Famotidine: 20 mg PO BID


Alprazolam: 0.25 mg PO TID PRN

## 2018-04-29 NOTE — PN
Progress Note





- Progress Note


Date of Service: 04/29/18


Note: 


Patient had an uneventful evening, and is oxygenating adequately on inhaled O2 

at 5 L/min. She is tachypneic but shows no respiratory distress. She has been 

afebrile since the day of admission (4/25) and leukocytosis has resolved. The 

bacteremic pneumococcal pneumonia is being treated with IV ceftriaxone (day 4 

of therapy).





VS: T=98, YC=201/70, P=72, R=24, SpO2 = 99% (on O2 at 5 L/min)





PHYSICAL EXAM:


Gen: alert, oriented - up in bed eating breakfast.


Lungs: Scattered crackles in both lungs, primarily at the bases.


Extremities: No cyanosis or edema.





LABS:


None today





IMPRESSION:


Continues to improve





PLAN:


Will transfer out of ICU to continue IV antibiotics for at least one more day. 

Will need repeat blood cultures when antibiotic Rx has completed, to document 

clearing of the bacteremia.  As far as transfer to [facility] is concerned, 

they have not contacted us regarding a bed, but transfer is probably not 

necessary at this point, since patient is much better, and will probably be 

discharged in a few days.





Contacts at [facility]:  Dilia Ribeiro (transplant coordinator):  [phone] 

or [phone] (cell) and Dr. Grayson Mancia (pulmonary): [phone] (cell)

## 2018-04-30 VITALS — DIASTOLIC BLOOD PRESSURE: 40 MMHG | SYSTOLIC BLOOD PRESSURE: 108 MMHG

## 2018-04-30 RX ADMIN — ENOXAPARIN SODIUM SCH MG: 40 INJECTION SUBCUTANEOUS at 13:47

## 2018-04-30 RX ADMIN — MONTELUKAST SODIUM SCH MG: 10 TABLET, COATED ORAL at 08:49

## 2018-04-30 RX ADMIN — GUAIFENESIN AND CODEINE PHOSPHATE PRN ML: 100; 10 SOLUTION ORAL at 08:49

## 2018-04-30 RX ADMIN — ALPRAZOLAM PRN MG: 0.25 TABLET ORAL at 08:49

## 2018-04-30 RX ADMIN — POTASSIUM CHLORIDE SCH MEQ: 1500 TABLET, EXTENDED RELEASE ORAL at 08:49

## 2018-04-30 RX ADMIN — IPRATROPIUM BROMIDE AND ALBUTEROL SULFATE SCH: .5; 3 SOLUTION RESPIRATORY (INHALATION) at 01:21

## 2018-04-30 RX ADMIN — BUDESONIDE AND FORMOTEROL FUMARATE DIHYDRATE SCH PUFF: 160; 4.5 AEROSOL RESPIRATORY (INHALATION) at 08:42

## 2018-04-30 RX ADMIN — IPRATROPIUM BROMIDE AND ALBUTEROL SULFATE SCH NEB: .5; 3 SOLUTION RESPIRATORY (INHALATION) at 13:54

## 2018-04-30 RX ADMIN — VALSARTAN SCH MG: 160 TABLET ORAL at 08:49

## 2018-04-30 RX ADMIN — PREDNISONE SCH MG: 20 TABLET ORAL at 08:50

## 2018-04-30 RX ADMIN — Medication SCH TAB: at 08:49

## 2018-04-30 RX ADMIN — HYDROCHLOROTHIAZIDE SCH MG: 25 TABLET ORAL at 08:49

## 2018-04-30 RX ADMIN — FAMOTIDINE SCH MG: 20 TABLET, FILM COATED ORAL at 08:49

## 2018-04-30 RX ADMIN — SERTRALINE SCH MG: 50 TABLET, FILM COATED ORAL at 08:50

## 2018-04-30 RX ADMIN — CEFTRIAXONE SODIUM SCH MLS/HR: 2 INJECTION, POWDER, FOR SOLUTION INTRAVENOUS at 11:15

## 2018-04-30 RX ADMIN — IPRATROPIUM BROMIDE AND ALBUTEROL SULFATE SCH: .5; 3 SOLUTION RESPIRATORY (INHALATION) at 08:42
